# Patient Record
Sex: MALE | Race: BLACK OR AFRICAN AMERICAN | Employment: OTHER | URBAN - METROPOLITAN AREA
[De-identification: names, ages, dates, MRNs, and addresses within clinical notes are randomized per-mention and may not be internally consistent; named-entity substitution may affect disease eponyms.]

---

## 2020-02-20 PROBLEM — R20.2 PARESTHESIA OF BILATERAL LEGS: Status: ACTIVE | Noted: 2020-02-20

## 2020-02-20 PROBLEM — R53.1 WEAKNESS: Status: ACTIVE | Noted: 2020-02-20

## 2020-03-04 ENCOUNTER — HOSPITAL ENCOUNTER (OUTPATIENT)
Dept: INTERVENTIONAL RADIOLOGY/VASCULAR | Age: 70
Discharge: HOME | End: 2020-03-04
Attending: PSYCHIATRY & NEUROLOGY
Payer: MEDICARE

## 2020-03-04 VITALS
OXYGEN SATURATION: 95 % | RESPIRATION RATE: 18 BRPM | DIASTOLIC BLOOD PRESSURE: 75 MMHG | HEART RATE: 69 BPM | SYSTOLIC BLOOD PRESSURE: 137 MMHG | TEMPERATURE: 98.1 F

## 2020-03-04 DIAGNOSIS — G61.0 AIDP (ACUTE INFLAMMATORY DEMYELINATING POLYNEUROPATHY) (HCC): ICD-10-CM

## 2020-03-04 LAB
APPEARANCE FLD: CLEAR
COLOR FLD: COLORLESS
GLUCOSE CSF-MCNC: 57 MG/DL (ref 40–70)
NUC CELL # FLD: 1 /CU MM
PROT CSF-MCNC: 76 MG/DL (ref 15–45)
RBC # FLD: 0 /CU MM
SPECIMEN SOURCE FLD: NORMAL
TUBE # CSF: ABNORMAL
TUBE # CSF: NORMAL

## 2020-03-04 PROCEDURE — 87205 SMEAR GRAM STAIN: CPT

## 2020-03-04 PROCEDURE — 77030003666 HC NDL SPINAL BD -A

## 2020-03-04 PROCEDURE — 77030014143 HC TY PUNC LUMBR BD -A

## 2020-03-04 PROCEDURE — 84157 ASSAY OF PROTEIN OTHER: CPT

## 2020-03-04 PROCEDURE — 82945 GLUCOSE OTHER FLUID: CPT

## 2020-03-04 PROCEDURE — 62328 DX LMBR SPI PNXR W/FLUOR/CT: CPT

## 2020-03-04 PROCEDURE — 74011000250 HC RX REV CODE- 250: Performed by: PHYSICIAN ASSISTANT

## 2020-03-04 PROCEDURE — 89050 BODY FLUID CELL COUNT: CPT

## 2020-03-04 RX ORDER — LIDOCAINE HYDROCHLORIDE 20 MG/ML
1-10 INJECTION, SOLUTION INFILTRATION; PERINEURAL ONCE
Status: COMPLETED | OUTPATIENT
Start: 2020-03-04 | End: 2020-03-04

## 2020-03-04 RX ADMIN — LIDOCAINE HYDROCHLORIDE 100 MG: 20 INJECTION, SOLUTION INFILTRATION; PERINEURAL at 10:48

## 2020-03-04 NOTE — PROGRESS NOTES
Procedure  :Lumbar Puncture performed. Opening Pressures 17cm H2O. Approximately 12cc of fluid. CSF sample sent to lab. Pt tolerated without difficulty.

## 2020-03-04 NOTE — DISCHARGE INSTRUCTIONS
[address]  Department of Interventional Radiology  [phone] Office  [phone] Fax  POST LUMBAR PUNCTURE  DISCHARGE INSTRUCTIONS  General Information:  Lumbar Puncture: A LP is done to help diagnose several disorders, like pseudo tumor, migraines, meningitis, and multiple sclerosis. It involves a puncture (usually in the lower spine) into the sac that protects the spinal column. A sample of the fluid in that space is removed and tested in the lab. Call If:     You should call your Physician and/or the Radiology Nurse if you develop a headache that is not relieved by Tylenol, and worsens when you stand and eases when you lie down, you need to call. You may have developed what is referred to as a spinal headache. Our physician's will probably advise you to be on strict bed rest for 24 hours, to drink lots of fluids and caffeine. If this does not help the head pain, call again the next day. You should call if you have bleeding other than a small spot on your bandage. You should call if you have any numbness, tingling, weakness, fever, chills, urinary retention, severe itching, rash, welts, swelling, or confusion. Follow-Up Instructions: See the doctor who ordered your procedure as he/she has instructed. If you had a Lumbar Puncture or Myelogram, your results should be available to your ordering doctor in 3-5 business days. You can remove your dressing in 24 hours and shower regularly. Do not bathe or swim for 72 hours. To Reach Us: If you have any questions about your procedure, please call the Interventional Radiology department at [phone]. After business hours (5pm) and weekends, call the answering service at [phone] and ask for the Radiologist on call to be paged.      Interventional Radiology General Nurse Discharge    After general anesthesia or intravenous sedation, for 24 hours or while taking prescription Narcotics:  · Limit your activities  · Do not drive and operate hazardous machinery  · Do not make important personal or business decisions  · Do  not drink alcoholic beverages  · If you have not urinated within 8 hours after discharge, please contact your surgeon on call. * Please give a list of your current medications to your Primary Care Provider. * Please update this list whenever your medications are discontinued, doses are     changed, or new medications (including over-the-counter products) are added. * Please carry medication information at all times in case of emergency situations. These are general instructions for a healthy lifestyle:    No smoking/ No tobacco products/ Avoid exposure to second hand smoke  Surgeon General's Warning:  Quitting smoking now greatly reduces serious risk to your health. Obesity, smoking, and sedentary lifestyle greatly increases your risk for illness  A healthy diet, regular physical exercise & weight monitoring are important for maintaining a healthy lifestyle    You may be retaining fluid if you have a history of heart failure or if you experience any of the following symptoms:  Weight gain of 3 pounds or more overnight or 5 pounds in a week, increased swelling in our hands or feet or shortness of breath while lying flat in bed. Please call your doctor as soon as you notice any of these symptoms; do not wait until your next office visit. Recognize signs and symptoms of STROKE:  F-face looks uneven    A-arms unable to move or move unevenly    S-speech slurred or non-existent    T-time-call 911 as soon as signs and symptoms begin-DO NOT go       Back to bed or wait to see if you get better-TIME IS BRAIN.         Patient Signature:  Date: 3/4/2020  Discharging Nurse: Candy Prasad

## 2020-03-09 LAB
BACTERIA SPEC CULT: NORMAL
GRAM STN SPEC: NORMAL
GRAM STN SPEC: NORMAL
SERVICE CMNT-IMP: NORMAL

## 2022-03-18 PROBLEM — R53.1 WEAKNESS: Status: ACTIVE | Noted: 2020-02-20

## 2022-03-20 PROBLEM — R20.2 PARESTHESIA OF BILATERAL LEGS: Status: ACTIVE | Noted: 2020-02-20

## 2022-10-25 DIAGNOSIS — G62.9 PERIPHERAL POLYNEUROPATHY: Primary | ICD-10-CM

## 2022-10-26 RX ORDER — GABAPENTIN 300 MG/1
300 CAPSULE ORAL 3 TIMES DAILY
Qty: 270 CAPSULE | Refills: 1 | Status: SHIPPED | OUTPATIENT
Start: 2022-10-26 | End: 2023-04-24

## 2023-02-24 ENCOUNTER — OFFICE VISIT (OUTPATIENT)
Dept: NEUROLOGY | Age: 73
End: 2023-02-24

## 2023-02-24 VITALS
WEIGHT: 224 LBS | HEART RATE: 90 BPM | DIASTOLIC BLOOD PRESSURE: 82 MMHG | OXYGEN SATURATION: 97 % | BODY MASS INDEX: 31.36 KG/M2 | SYSTOLIC BLOOD PRESSURE: 110 MMHG | HEIGHT: 71 IN

## 2023-02-24 DIAGNOSIS — M79.2 NEUROPATHIC PAIN: ICD-10-CM

## 2023-02-24 DIAGNOSIS — Z86.69 HISTORY OF GUILLAIN-BARRE SYNDROME: ICD-10-CM

## 2023-02-24 DIAGNOSIS — G62.9 PERIPHERAL POLYNEUROPATHY: Primary | ICD-10-CM

## 2023-02-24 RX ORDER — PRAVASTATIN SODIUM 20 MG
TABLET ORAL
COMMUNITY
Start: 2022-12-11

## 2023-02-24 RX ORDER — AMLODIPINE BESYLATE 5 MG/1
5 TABLET ORAL NIGHTLY
COMMUNITY
Start: 2022-07-25

## 2023-02-24 RX ORDER — EPINEPHRINE 0.3 MG/.3ML
0.3 INJECTION SUBCUTANEOUS
COMMUNITY
Start: 2014-06-23

## 2023-02-24 RX ORDER — SILDENAFIL 50 MG/1
TABLET, FILM COATED ORAL
COMMUNITY
Start: 2023-01-27

## 2023-02-24 RX ORDER — SUMATRIPTAN 100 MG/1
100 TABLET, FILM COATED ORAL
COMMUNITY
Start: 2022-07-29

## 2023-02-24 RX ORDER — LORATADINE 10 MG/1
TABLET ORAL
COMMUNITY
Start: 2022-12-23

## 2023-02-24 NOTE — PROGRESS NOTES
[facility]  [address], [address] [facility], [address] [place] [address]  Phone: [phone] Fax [phone]  Brian Oh MD      Patient: Deniz Lees  Provider: Brian Oh MD    CC:   Chief Complaint   Patient presents with    Follow-up     Hereditary and idiopathic neuropathy    Neurologic Problem     Referring Provider: Ailyn Christine NP    History of Present Illness:     Deniz Lees is a 73 y.o. RH male who presents for follow up of peripheral neuropathy. He is unaccompanied for today's visit. He was last seen March 2022. He presents for follow-up and continued management of peripheral neuropathy. Of note, there was a previous episode in February 2020 with a several week history of ascending lower extremity numbness up to his groin, concerning for possible Guillain-Barré syndrome. See prior notes for details. Fortunately the episode resolved spontaneously after several weeks without any formal treatment. He continues to have numbness in the toes on both feet. Current medications include:  Gabapentin 300 mg 3 times a day (patient reports taking 600 mg in the morning)    Previous medication trials include: N/A    Patient presents today for follow-up. Things continue to be very stable. Continues to have some numbness in the toes with occasional sharp shooting like pains and gabapentin has been helpful for management. Continues to have no significant gait or balance impairment. No bulbar symptoms. No upper extremity symptoms. Work-up for other acquired causes of peripheral neuropathy have revealed some mildly elevated serum glucoses but otherwise unremarkable. Review of Systems:   Review of Systems   Constitutional:  Negative for fever. HENT:  Negative for hearing loss. Eyes:  Negative for visual disturbance. Respiratory:  Negative for cough. Cardiovascular:  Negative for chest pain. Gastrointestinal:  Negative for abdominal pain.    Genitourinary: Negative for dysuria. Musculoskeletal:  Negative for gait problem. Skin:  Negative for rash. Allergic/Immunologic: Negative for immunocompromised state. Neurological:  Positive for numbness. Negative for tremors and weakness. Psychiatric/Behavioral:  Negative for confusion and hallucinations. Lab/Imaging Review:   I REVIEWED PERTINENT LABS, IMAGES, AND REPORTS WITH THE PATIENT PERSONALLY, DIRECTLY AND FULLY. THE MOST PERTINENT FINDINGS ARE NOTED BELOW:    Lab Results   Component Value Date    TSH 1.280 08/31/2020     Hemoglobin A1C   Date Value Ref Range Status   08/31/2020 6.3 (H) 4.8 - 5.6 % Final     Comment:              Prediabetes: 5.7 - 6.4           Diabetes: >6.4           Glycemic control for adults with diabetes: <7.0       No results found for: SEDRATE      CSF Labs March 2020:  Culture negative. Protein 76. Glucose 57. WBC 1. RBC 0.     EMG/NCV February 2020:  INTERPRETATION: THERE IS SOME INCONCLUSIVE ABNORMALITIES OF THE ELECTROPHYSIOLOGIC TESTING REVEALING SOME SENSORY NEUROPATHY WITHOUT DEFINITIVE MOTOR NEUROPATHY NOR ANY EVIDENCE OF RADICULOPATHY, MYOPATHY, MYOTONIA, OR PLEXOPATHY. NO FASCICULATIONS OR UPPER MOTOR NEURON SIGN. CONCLUSION:    Equivocal and inconclusive evidence of some sensory neuropathy of the lower extremities distal at the sural nerves bilaterally. Mild. Appears to correlate with some of his general complaints of the feet. Outside Labs February 2020:   B12 601. ESR 2.3. CRP wnl. CBC wnl. CMP with elevated glucose 101 and slightly low K 3.3. XR Spine Lumbar December 2019:   IMPRESSION:  No acute process. Degenerative disc narrowing at L5-S1. CT Head December 2019:   IMPRESSION:  No acute intracranial abnormality. Past Medical History:     Past medical history, surgical history, social history, family history, medications, and allergies were reviewed and updated as appropriate.      PAST MEDICAL HISTORY:  Past Medical History: Diagnosis Date    HTN (hypertension)     Hyperlipidemia     Migraine      PAST SURGICAL HISTORY:   Past Surgical History:   Procedure Laterality Date    HERNIA REPAIR      ORTHOPEDIC SURGERY Right     wrist, 4-5 different surgeries    OTHER SURGICAL HISTORY Left     bone spurs, elbow and foot    ROTATOR CUFF REPAIR Right     TONSILLECTOMY       FAMILY HISTORY:  Family History   Problem Relation Age of Onset    Hypertension Mother     Other Father         does not know anything about him     SOCIAL HISTORY:  Social History     Socioeconomic History    Marital status:      Spouse name: None    Number of children: None    Years of education: None    Highest education level: None   Tobacco Use    Smoking status: Every Day     Packs/day: 0.25     Types: Cigarettes    Smokeless tobacco: Never   Substance and Sexual Activity    Alcohol use: Yes       Medications/Allergies:     MEDICATIONS:   Current Outpatient Medications on File Prior to Visit   Medication Sig Dispense Refill    amLODIPine (NORVASC) 5 MG tablet Take 5 mg by mouth nightly      EPINEPHrine (EPIPEN) 0.3 MG/0.3ML SOAJ injection Inject 0.3 mg into the muscle      loratadine (CLARITIN) 10 MG tablet TAKE 1 TABLET (10 MG TOTAL) BY MOUTH IN THE MORNING.      pravastatin (PRAVACHOL) 20 MG tablet TAKE 1 TABLET (20 MG TOTAL) BY MOUTH IN THE MORNING. sildenafil (VIAGRA) 50 MG tablet TAKE 1 TABLET BY MOUTH ONCE DAILY AS NEEDED FOR ERECTILE DYSFUNCTION      SUMAtriptan (IMITREX) 100 MG tablet Take 100 mg by mouth once as needed      Omega-3 Fatty Acids (FISH OIL PO) Take by mouth      Multiple Vitamins-Minerals (CENTRUM MEN PO) Take by mouth      gabapentin (NEURONTIN) 300 MG capsule Take 1 capsule by mouth 3 times daily for 180 days.  270 capsule 1    aspirin 81 MG EC tablet Take 81 mg by mouth daily      omeprazole (PRILOSEC) 20 MG delayed release capsule Take 20 mg by mouth daily      potassium chloride (KLOR-CON) 10 MEQ extended release tablet Take by mouth      valsartan-hydroCHLOROthiazide (DIOVAN-HCT) 160-25 MG per tablet Take 1 tablet by mouth daily       No current facility-administered medications on file prior to visit. ALLERGIES:  Allergies   Allergen Reactions    Bee Venom Anaphylaxis       Physical Exam:     Visit Vitals:  Vitals:    02/24/23 1051   BP: 110/82   Pulse: 90   SpO2: 97%     General Exam:  General - Well developed, well nourished, in no apparent distress. HEENT - Normocephalic, atraumatic. Sclera anicteric. Oropharynx clear. Neck - Supple without masses  Cardiovascular - Regular rate and rhythm. No carotid bruits. Lungs - Non-labored breathing. Abdomen - Soft, nontender, nondistended. Extremities - Peripheral pulses intact. No edema and no rashes. Neurological Exam:     MS/Language/Speech - Alert. Oriented to person, place, and time. Language fluent. Speech normal.     Cranial Nerves - PERRL. Eye movements full with normal pursuits. No nystagmus. Face was symmetric with good activation and normal sensation. Tongue and palate were midline. Shoulder shrug symmetric. Motor - Strength was full in all proximal and distal muscle groups. Tone was normal. There was no tremor or hyperkinetic movements. Sensory - Mildly diminished vibration in the distal lower extremities R worse than L but preserved at the ankle. Otherwise intact to pinprick and proprioception. Cerebellar - No ataxia or dysmetria with FNF bilaterally. Reflexes (R/L): Biceps (2+/2+), Triceps (1+/1+), Brachioradialis (1+/1+), Patellar (2+/2+), Ankle (1+/1+). Maier's was negative and plantar responses were flexor. Gait - He can rise from a seated position without difficulty. Posture normal. Romberg was intact. He walks with a slightly slowed but fluid gait. Assessment and Plan:     Sirisha Day is a 73 y.o. male who presents with the following issues:     Otlupis Mcfarland was seen today for follow-up and neurologic problem.     Diagnoses and all orders for this visit:    Peripheral polyneuropathy    History of Guillain-Marquette syndrome    Neuropathic pain      Patient presents for follow-up and continued management of idiopathic peripheral neuropathy with numbness and painful paresthesias in the toes bilaterally. Unclear if this is a residual symptom of a possible Guillain-Barré syndrome or just a mild sensory neuropathy at this point. Lab work-up for acquired causes has shown a mildly elevated hemoglobin A1c. I have discussed with him the importance of continued glucose monitoring and management if needed. At this time he will continue his current dose of gabapentin. We also discussed other over-the-counter topical ointments for neuropathic pain. Counseled to contact us should symptoms worsen. He will follow up in 12 months. Signature: Miriam Mcpherson MD      Date: 2/26/23  [facility]   [facility] [address] [place] [address]  Ph: [phone]  Fax: [phone]         I have personally interviewed and examined Mr. Mary Fleming and I have personally reviewed all relevant records including labs and imaging as noted above. I have written all aspects of this note. More than 50% of this time was used for counseling regarding my diagnosis, prognosis, and plans for management. Total visit time: 24 minutes.

## 2023-02-26 ASSESSMENT — ENCOUNTER SYMPTOMS
ABDOMINAL PAIN: 0
COUGH: 0

## 2023-12-12 DIAGNOSIS — G62.9 PERIPHERAL POLYNEUROPATHY: ICD-10-CM

## 2023-12-12 RX ORDER — GABAPENTIN 300 MG/1
300 CAPSULE ORAL 3 TIMES DAILY
Qty: 270 CAPSULE | Refills: 1 | Status: SHIPPED | OUTPATIENT
Start: 2023-12-12 | End: 2024-06-09

## 2023-12-12 NOTE — TELEPHONE ENCOUNTER
RX REFILL REQUEST      Tony Raaft  1950    **Medication Name:gabapentin    **Medication Dose:300 MG    **Frequency: 1 capsule TID    **Preferred Pharmacy Name:[facility]    **Pharmacy Number:[id number]    **Last OV:02/24/23

## 2024-02-27 ENCOUNTER — OFFICE VISIT (OUTPATIENT)
Dept: NEUROLOGY | Age: 74
End: 2024-02-27
Payer: MEDICARE

## 2024-02-27 VITALS
HEIGHT: 71 IN | SYSTOLIC BLOOD PRESSURE: 142 MMHG | HEART RATE: 73 BPM | BODY MASS INDEX: 30.66 KG/M2 | WEIGHT: 219 LBS | DIASTOLIC BLOOD PRESSURE: 91 MMHG

## 2024-02-27 DIAGNOSIS — Z86.69 HISTORY OF GUILLAIN-BARRE SYNDROME: ICD-10-CM

## 2024-02-27 DIAGNOSIS — G62.9 PERIPHERAL POLYNEUROPATHY: Primary | ICD-10-CM

## 2024-02-27 DIAGNOSIS — M79.2 NEUROPATHIC PAIN: ICD-10-CM

## 2024-02-27 PROCEDURE — 4004F PT TOBACCO SCREEN RCVD TLK: CPT | Performed by: PSYCHIATRY & NEUROLOGY

## 2024-02-27 PROCEDURE — 99213 OFFICE O/P EST LOW 20 MIN: CPT | Performed by: PSYCHIATRY & NEUROLOGY

## 2024-02-27 PROCEDURE — G8427 DOCREV CUR MEDS BY ELIG CLIN: HCPCS | Performed by: PSYCHIATRY & NEUROLOGY

## 2024-02-27 PROCEDURE — G8484 FLU IMMUNIZE NO ADMIN: HCPCS | Performed by: PSYCHIATRY & NEUROLOGY

## 2024-02-27 PROCEDURE — 1123F ACP DISCUSS/DSCN MKR DOCD: CPT | Performed by: PSYCHIATRY & NEUROLOGY

## 2024-02-27 PROCEDURE — G8417 CALC BMI ABV UP PARAM F/U: HCPCS | Performed by: PSYCHIATRY & NEUROLOGY

## 2024-02-27 PROCEDURE — 3017F COLORECTAL CA SCREEN DOC REV: CPT | Performed by: PSYCHIATRY & NEUROLOGY

## 2024-02-27 RX ORDER — GABAPENTIN 400 MG/1
400 CAPSULE ORAL 3 TIMES DAILY
Qty: 270 CAPSULE | Refills: 1 | Status: SHIPPED | OUTPATIENT
Start: 2024-02-27 | End: 2024-08-25

## 2024-02-27 ASSESSMENT — ENCOUNTER SYMPTOMS
COUGH: 0
ABDOMINAL PAIN: 0

## 2024-02-27 NOTE — PROGRESS NOTES
tablet Take 1 tablet by mouth daily      aspirin 81 MG EC tablet Take 81 mg by mouth daily (Patient not taking: Reported on 2/27/2024)       No current facility-administered medications on file prior to visit.     ALLERGIES:  Allergies   Allergen Reactions    Bee Venom Anaphylaxis       Physical Exam:     Visit Vitals:  Vitals:    02/27/24 1433   BP: (!) 142/91   Pulse: 73     General Exam:  General - Well developed, well nourished, in no apparent distress.   HEENT - Normocephalic, atraumatic. Sclera anicteric. Oropharynx clear.   Neck - Supple without masses  Cardiovascular - Regular rate and rhythm. No carotid bruits.   Lungs - Non-labored breathing.  Abdomen - Soft, nontender, nondistended.   Extremities - Peripheral pulses intact. No edema and no rashes.     Neurological Exam:     MS/Language/Speech - Alert. Oriented to person, place, and time. Language fluent. Speech normal.     Cranial Nerves - PERRL. Eye movements full with normal pursuits. No nystagmus. Face was symmetric with good activation and normal sensation. Tongue and palate were midline. Shoulder shrug symmetric.    Motor - Strength was full in all proximal and distal muscle groups. Tone was normal. There was no tremor or hyperkinetic movements.     Sensory - Mildly diminished vibration in the distal lower extremities R worse than L but preserved at the ankle. Otherwise intact to pinprick and proprioception.      Cerebellar - No ataxia or dysmetria with FNF bilaterally.      Reflexes (R/L): Biceps (2+/2+), Triceps (1+/1+), Brachioradialis (1+/1+), Patellar (2+/2+), Ankle (1+/1+). Maier's was negative and plantar responses were flexor.      Gait - He can rise from a seated position without difficulty. Posture normal. Romberg was intact. He walks with a slightly slowed but fluid gait.       Assessment and Plan:     Pieter Ruiz is a 71 y.o. male who presents with the following issues:     Pieter was seen today for follow-up.    Diagnoses and

## 2024-11-25 DIAGNOSIS — G62.9 PERIPHERAL POLYNEUROPATHY: ICD-10-CM

## 2024-11-25 NOTE — TELEPHONE ENCOUNTER
RX REFILL REQUEST      Pieter Ruiz  1950    **Medication Name: gabapentin    **Medication Dose: 400mg    **Frequency: TID    **Preferred Pharmacy Name: [facility]

## 2024-11-26 RX ORDER — GABAPENTIN 400 MG/1
400 CAPSULE ORAL 3 TIMES DAILY
Qty: 270 CAPSULE | Refills: 1 | Status: SHIPPED | OUTPATIENT
Start: 2024-11-26 | End: 2025-05-25

## 2024-12-02 DIAGNOSIS — G62.9 PERIPHERAL POLYNEUROPATHY: ICD-10-CM

## 2024-12-02 RX ORDER — GABAPENTIN 400 MG/1
400 CAPSULE ORAL 3 TIMES DAILY
Qty: 270 CAPSULE | Refills: 3 | Status: SHIPPED | OUTPATIENT
Start: 2024-12-02 | End: 2025-11-27

## 2025-02-27 ASSESSMENT — ENCOUNTER SYMPTOMS
COUGH: 0
ABDOMINAL PAIN: 0

## 2025-02-28 ENCOUNTER — OFFICE VISIT (OUTPATIENT)
Dept: NEUROLOGY | Age: 75
End: 2025-02-28

## 2025-02-28 VITALS
SYSTOLIC BLOOD PRESSURE: 131 MMHG | DIASTOLIC BLOOD PRESSURE: 83 MMHG | HEART RATE: 70 BPM | WEIGHT: 223 LBS | HEIGHT: 71 IN | BODY MASS INDEX: 31.22 KG/M2

## 2025-02-28 DIAGNOSIS — Z86.69 HISTORY OF GUILLAIN-BARRE SYNDROME: ICD-10-CM

## 2025-02-28 DIAGNOSIS — M79.2 NEUROPATHIC PAIN: ICD-10-CM

## 2025-02-28 DIAGNOSIS — G62.9 PERIPHERAL POLYNEUROPATHY: Primary | ICD-10-CM

## 2025-02-28 RX ORDER — LOSARTAN POTASSIUM AND HYDROCHLOROTHIAZIDE 25; 100 MG/1; MG/1
1 TABLET ORAL DAILY
COMMUNITY
Start: 2025-02-04 | End: 2026-02-04

## 2025-02-28 NOTE — PROGRESS NOTES
PAST MEDICAL HISTORY:  Past Medical History:   Diagnosis Date    HTN (hypertension)     Hyperlipidemia     Migraine      PAST SURGICAL HISTORY:   Past Surgical History:   Procedure Laterality Date    HERNIA REPAIR      ORTHOPEDIC SURGERY Right     wrist, 4-5 different surgeries    OTHER SURGICAL HISTORY Left     bone spurs, elbow and foot    ROTATOR CUFF REPAIR Right     TONSILLECTOMY       FAMILY HISTORY:  Family History   Problem Relation Age of Onset    Hypertension Mother     Other Father         does not know anything about him     SOCIAL HISTORY:  Social History     Socioeconomic History    Marital status:      Spouse name: None    Number of children: None    Years of education: None    Highest education level: None   Tobacco Use    Smoking status: Every Day     Current packs/day: 0.25     Types: Cigarettes    Smokeless tobacco: Never   Substance and Sexual Activity    Alcohol use: Yes     Social Determinants of Health     Financial Resource Strain: Low Risk  (6/10/2024)    Received from [facility]    Overall Financial Resource Strain (CARDIA)     Difficulty of Paying Living Expenses: Not hard at all   Food Insecurity: No Food Insecurity (6/10/2024)    Received from [facility]    Hunger Vital Sign     Worried About Running Out of Food in the Last Year: Never true     Ran Out of Food in the Last Year: Never true   Transportation Needs: No Transportation Needs (6/10/2024)    Received from [facility]    PRAPARE - Transportation     Lack of Transportation (Medical): No     Lack of Transportation (Non-Medical): No   Physical Activity: Inactive (6/10/2024)    Received from [facility]    Exercise Vital Sign     Days of Exercise per Week: 0 days     Minutes of Exercise per Session: 0 min   Stress: No Stress Concern Present (6/10/2024)    Received from [facility]